# Patient Record
Sex: MALE | Race: BLACK OR AFRICAN AMERICAN | Employment: UNEMPLOYED | URBAN - METROPOLITAN AREA
[De-identification: names, ages, dates, MRNs, and addresses within clinical notes are randomized per-mention and may not be internally consistent; named-entity substitution may affect disease eponyms.]

---

## 2021-05-26 ENCOUNTER — HOSPITAL ENCOUNTER (EMERGENCY)
Age: 21
Discharge: HOME | End: 2021-05-26
Attending: EMERGENCY MEDICINE
Payer: MEDICAID

## 2021-05-26 VITALS
HEIGHT: 74 IN | WEIGHT: 185 LBS | TEMPERATURE: 97.3 F | OXYGEN SATURATION: 99 % | BODY MASS INDEX: 23.74 KG/M2 | SYSTOLIC BLOOD PRESSURE: 132 MMHG | DIASTOLIC BLOOD PRESSURE: 73 MMHG | HEART RATE: 85 BPM | RESPIRATION RATE: 16 BRPM

## 2021-05-26 DIAGNOSIS — G47.33 OSA (OBSTRUCTIVE SLEEP APNEA): Primary | ICD-10-CM

## 2021-05-26 PROCEDURE — 99283 EMERGENCY DEPT VISIT LOW MDM: CPT

## 2021-05-26 RX ORDER — FAMOTIDINE 20 MG/1
20 TABLET, FILM COATED ORAL 2 TIMES DAILY
Qty: 20 TABLET | Refills: 0 | Status: SHIPPED | OUTPATIENT
Start: 2021-05-26

## 2021-05-26 NOTE — ED PROVIDER NOTES
EMERGENCY DEPARTMENT HISTORY AND PHYSICAL EXAM      Date: 5/26/2021  Patient Name: Shona Azar    History of Presenting Illness     Chief Complaint   Patient presents with    Sleep Apnea       History Provided By: Patient and Patient's Grandmother    HPI: Shona Azar, 22 y.o. male without past medical history presents to the ED with cc of concern for choking while sleeping and sleep disturbance. Patient's grandmother brings patient into the emergency department because he has been having worsening episodes of choking and irregular breathing while he sleeps. Patient does not know about this but states that he will wake up sometimes coughing and choking. He states that he still feels rested after sleep. Sometimes he does need to take a nap throughout his day. This has been ongoing for the past 6 months but has been progressively worsening. Patient has never seen a sleep specialist about this. He denies any fevers, chills, shortness of breath, chest pain, or recent illness. He uses melatonin to help sleep sometimes but otherwise does not take any sleeping pills. He does not have any symptoms while awake. There are no other complaints, changes, or physical findings at this time. PCP: Pancho, MD Morales    No current facility-administered medications on file prior to encounter. No current outpatient medications on file prior to encounter. Past History     Past Medical History:  History reviewed. No pertinent past medical history. Past Surgical History:  History reviewed. No pertinent surgical history. Family History:  History reviewed. No pertinent family history.     Social History:  Social History     Tobacco Use    Smoking status: Never Smoker    Smokeless tobacco: Never Used   Substance Use Topics    Alcohol use: Not Currently    Drug use: Yes     Types: Marijuana       Allergies:  No Known Allergies      Review of Systems   Review of Systems   Constitutional: Negative for chills and fever. HENT: Negative. Eyes: Negative for visual disturbance. Respiratory: Positive for choking. Negative for cough, shortness of breath and wheezing. Cardiovascular: Negative for chest pain. Gastrointestinal: Negative for abdominal pain and vomiting. Neurological: Negative for headaches. All other systems reviewed and are negative. Physical Exam   Physical Exam  Vitals and nursing note reviewed. Constitutional:       General: He is not in acute distress. Appearance: Normal appearance. He is not ill-appearing, toxic-appearing or diaphoretic. HENT:      Head: Normocephalic and atraumatic. Mouth/Throat:      Comments: Posterior oropharynx with significantly large tonsils which are almost touching. No sign of obstruction, tolerating secretions, airway patent. Cardiovascular:      Rate and Rhythm: Normal rate and regular rhythm. Heart sounds: Normal heart sounds. No murmur heard. Pulmonary:      Effort: Pulmonary effort is normal. No respiratory distress. Breath sounds: Normal breath sounds. No wheezing. Abdominal:      Palpations: Abdomen is soft. Tenderness: There is no abdominal tenderness. There is no guarding or rebound. Musculoskeletal:      Cervical back: Normal range of motion and neck supple. No tenderness. Skin:     General: Skin is warm and dry. Findings: No erythema or rash. Neurological:      General: No focal deficit present. Mental Status: He is alert and oriented to person, place, and time. Diagnostic Study Results     Labs -   No results found for this or any previous visit (from the past 12 hour(s)). Radiologic Studies -   No orders to display     CT Results  (Last 48 hours)    None        CXR Results  (Last 48 hours)    None          Medical Decision Making   I am the first provider for this patient.     I reviewed the vital signs, available nursing notes, past medical history, past surgical history, family history and social history. Vital Signs-Reviewed the patient's vital signs. Visit Vitals  /73 (BP 1 Location: Right upper arm, BP Patient Position: At rest)   Pulse 85   Temp 97.3 °F (36.3 °C)   Resp 16   Ht 6' 2\" (1.88 m)   Wt 83.9 kg (185 lb)   SpO2 99%   BMI 23.75 kg/m²       Records Reviewed: Nursing Notes    Provider Notes (Medical Decision Making):   43-year-old male brought to the emergency department by his grandmother after witnessed episodes of irregular breathing and choking episodes while he sleeps. Patient is mostly unaware of these episodes, he does not have any episodes while he is awake. He states that he feels well rested. He does note that he snores frequently. He is afebrile and vital signs are stable, he has no other complaints here in the ED. My exam reveals enlarged tonsils bilaterally which almost touch, this is likely source of obstructive sleep apnea. No sign of infection, tonsillitis, posterior oropharyngeal abscess. Patient is afebrile and vital signs are stable. Encouraged follow-up with PCP, sleep medicine, and ENT. All questions answered and patient and grandmother agree with plan as above. ED Course:   Initial assessment performed. The patients presenting problems have been discussed, and they are in agreement with the care plan formulated and outlined with them. I have encouraged them to ask questions as they arise throughout their visit. Discharge Note:  The patient has been re-evaluated and is ready for discharge. Reviewed available results with patient. Counseled patient on diagnosis and care plan. Patient has expressed understanding, and all questions have been answered. Patient agrees with plan and agrees to follow up as recommended, or to return to the ED if their symptoms worsen. Discharge instructions have been provided and explained to the patient, along with reasons to return to the ED. Disposition:  Discharge    DISCHARGE PLAN:  1.    Discharge Medication List as of 5/26/2021  9:08 AM        2. Follow-up Information     Follow up With Specialties Details Why Contact Info    Meghann Trivedi MD Otolaryngology Schedule an appointment as soon as possible for a visit  for evaluation of enlarged tonsils [address] [facility]  [address]  [address] 077 [facility]      Orlando Schmidt MD Internal Medicine, Pulmonary Disease Schedule an appointment as soon as possible for a visit  for evaluation of sleep apnea and possible need for a sleep study [address]  [address]  [address] [phone] [address] [facility] Internal Medicine Schedule an appointment as soon as possible for a visit  to establish with a PCP [place] [id number] [place] [phone]        3. Return to ED if worse     Diagnosis     Clinical Impression:   1. CESAR (obstructive sleep apnea)        Attestations:  I am the first and primary provider of record for this patient's ED encounter. I personally performed the services described above in this documentation. Eduar Davis MD    Please note that this dictation was completed with 1Ring, the [employer] voice recognition software. Quite often unanticipated grammatical, syntax, homophones, and other interpretive errors are inadvertently transcribed by the computer software. Please disregard these errors. Please excuse any errors that have escaped final proofreading. Thank you.

## 2021-05-26 NOTE — ED TRIAGE NOTES
Patient presents to the ED with c/o shortness of breath when laying down. Pt reports that he sleeps well and does not notice any issues but his family reports that he takes long pauses in between breathing. Pt denies any nasal congestion or cough.

## 2021-05-26 NOTE — ED NOTES
Patient given copy of dc instructions and 1 paper script(s) and 0 electronic scripts. Patient  verbalized understanding of instructions and script (s). Patient given a current medication reconciliation form and verbalized understanding of their medications. Patient  verbalized understanding of the importance of discussing medications with  his or her physician or clinic they will be following up with. Patient alert and oriented and in no acute distress. Patient offered wheelchair from treatment area to hospital entrance, patient declined wheelchair.

## 2021-05-26 NOTE — DISCHARGE INSTRUCTIONS
It was a pleasure taking care of you in our Emergency Department today. We know that when you come to [address] [place] [address], you are entrusting us with your health, comfort, and safety. Our physicians and nurses honor that trust, and truly appreciate the opportunity to care for you and your loved ones. We also value your feedback. If you receive a survey about your Emergency Department experience today, please fill it out. We care about our patients' feedback, and we listen to what you have to say. Thank you!       Local Primary Care Physicians  [facility] [phone]  MD Vanita Alvarado MD Arnie Lance, MD [facility] [phone]  Nell Ramirez, [facility]  MD Diane Tyler MD Jodeen Person, MD [address] [facility] [facility] [phone]  [place] [address] [facility] [phone]  Enid Means, MD Perley Kim, MD Michalene Gaul, MD Marinell Runner, MD   [facility] [phone]  [facility], MD Dennys Purdy, MD [facility], NP [address] [phone]  Lauree Clinton, MD Ricka Duty, MD Danton Nettle, MD Lauri Lighter, MD Dorene Golas, MD Daine Ropes, MD Marcy Habermann, MD   [facility]  Evie Gonzales MD [address] [facility] [phone]  MD Branden Mckinnon, JERILYN Alvarenga, MD Karina Meneses MD Sol Cromer, MD Jim Sakai, MD   6644 [facility] [phone]  MD Robi Aquino, P  Sulma French, NP  MD Burgess Tra Low MD Roosvelt Puller, MD Jeff Lagunas MD [facility] [phone]  MD Paty Penaloza MD Pauleen Oas, MD Thom Paterson, MD  Gus Bakes, MD   [phone]  MD Rafia Thakkar MD [place] [phone]  MD Penny Higgins MD Bard Bilberry, MD   [facility] [phone]  MD Aidan Prado MD Bernestine Forster, MD Marilou Radon, MD Aldo Buddle, MD Verona Blazer, NP  Nathan Barkley MD 1619  66   [phone]  MD Aditya Devries MD Carey Clement, MD   2102 [facility] [phone]  MD Shanta Belcher, FNP  Kristie Gross, PAVidhiC  Kristie Grsos, FNP  Herman Fleischer, PAVidhiC  Julee Aase, MD Joelene Latus, NP   Charlie Davison,              Miscellaneous:  Gertrudis Hong MD [facility] Departments     For adult and child immunizations, family planning, TB screening, STD testing and women's health services. [facility]: [place] [phone]      [facility] Rosalee D 25   [address]   [address]   170 [facility]: Radha Myrick 200 [facility] [address] [phone]      [address] [place] [address]          [address] [place] [address]     For primary care services, woman and child wellness, and some clinics providing specialty care. [facility] -- [facility]. [address] [place] [address] [phone]   [address] [place] [address] [facility] 200 [facility] 3614 [facility] [phone]   [address] [place] [address] [address] [place] [address] [phone]   [address]  [employer] [address] [place] [address] [address] [place] [address] [phone]   [address] [place] [address] [address] [place] [phone]   [facility] 81 [facility] [phone]   Iain List the [facility] [address] [place] [address] [phone]   [facility]: [address] [phone], ext Candace [address] [place] [address], [phone]     [place] [address] [address] [phone]   [facility] Out[facility] 5850 [facility]  [phone]   [employer]  [address], [phone] 3588 [employer] ([email or web address]) [phone]         Sexual Health/Woman Wellness Clinics    For STD/HIV testing and treatment, pregnancy testing and services, men's health, birth control services, LGBT services, and hepatitis/HPV vaccine services. [facility] [employer] 201 [address] [facility] 75 [facility] 1579 600 EMRCY Solomon [phone]   [facility] [address], 5th floor [phone]   Pregnancy [address] [facility] [address]  Mauricio Manolo [phone]         [place]ocr[place] 9967 [facility] [address] [place] [address]   [phone]   [place]   [phone]   Women, Infant and Children's Services: [address] [phone]       [address] [place] [address]   [phone]   [address]   [facility] Psychiatry     [phone]   [address] Crisis   1212 [facility] [phone]

## 2022-11-08 ENCOUNTER — HOSPITAL ENCOUNTER (EMERGENCY)
Age: 22
Discharge: HOME | End: 2022-11-08
Attending: EMERGENCY MEDICINE
Payer: MEDICAID

## 2022-11-08 ENCOUNTER — APPOINTMENT (OUTPATIENT)
Dept: GENERAL RADIOLOGY | Age: 22
End: 2022-11-08
Attending: EMERGENCY MEDICINE
Payer: MEDICAID

## 2022-11-08 VITALS
BODY MASS INDEX: 23.44 KG/M2 | DIASTOLIC BLOOD PRESSURE: 85 MMHG | SYSTOLIC BLOOD PRESSURE: 142 MMHG | WEIGHT: 188.49 LBS | HEIGHT: 75 IN | TEMPERATURE: 98 F | OXYGEN SATURATION: 100 % | RESPIRATION RATE: 18 BRPM | HEART RATE: 81 BPM

## 2022-11-08 DIAGNOSIS — S46.911A STRAIN OF RIGHT SHOULDER, INITIAL ENCOUNTER: Primary | ICD-10-CM

## 2022-11-08 PROCEDURE — 73030 X-RAY EXAM OF SHOULDER: CPT

## 2022-11-08 PROCEDURE — 99283 EMERGENCY DEPT VISIT LOW MDM: CPT

## 2022-11-08 PROCEDURE — 74011250637 HC RX REV CODE- 250/637: Performed by: EMERGENCY MEDICINE

## 2022-11-08 RX ORDER — NAPROXEN 500 MG/1
500 TABLET ORAL 2 TIMES DAILY WITH MEALS
Qty: 20 TABLET | Refills: 0 | Status: SHIPPED | OUTPATIENT
Start: 2022-11-08 | End: 2022-11-18

## 2022-11-08 RX ORDER — METHOCARBAMOL 500 MG/1
500 TABLET, FILM COATED ORAL 4 TIMES DAILY
Qty: 20 TABLET | Refills: 0 | Status: SHIPPED | OUTPATIENT
Start: 2022-11-08

## 2022-11-08 RX ORDER — NAPROXEN 250 MG/1
500 TABLET ORAL
Status: COMPLETED | OUTPATIENT
Start: 2022-11-08 | End: 2022-11-08

## 2022-11-08 RX ADMIN — NAPROXEN 500 MG: 250 TABLET ORAL at 03:50

## 2022-11-08 NOTE — DISCHARGE INSTRUCTIONS
[facility] Departments     For adult and child immunizations, family planning, TB screening, STD testing and women's health services. [facility]: [place] [phone]      [facility] 25   [address]   [address]   170 [facility]: Danya Lamb [address] [phone]      [address]          [address] [place] [address]     For primary care services, woman and child wellness, and some clinics providing specialty care. [facility] -- [address] [facility] 2525 [facility] [phone]   411 [facility] 200 [facility] 3614 [facility] [phone]   339 [facility] 2[facility] [address] [phone] [address]  [facility] [address] [place] [address] 5850  Community  [phone]   [address] [place] [phone]   [facility] 81 [facility] [phone]   Genie Ocampo [facility] [address] [place] [address] [phone]   [facility]: [facility] 700 [address], ext [address] [facility], [address] [phone]     [place] [address] [facility] [address] [phone]   [place] 5850 [facility]  [phone]   [employer]  [address] ([email or web address]) [phone]         Sexual Health/Woman Wellness Clinics    For STD/HIV testing and treatment, pregnancy testing and services, men's health, birth control services, LGBT services, and hepatitis/HPV vaccine services. [facility] [employer] [facility] for [place] [employer] 201 N. [facility] 75 [facility] 1579 600 MERCY Bond [phone]   [facility] [address], 5th floor [phone]   Pregnancy [address] [facility] [address] N.  Daniele [facility] [phone]         Specialty Service [address]    [phone]   [place]   [phone]   Women, Infant and Children's Services: [address] [phone]       600 [facility]   [phone]   [address]   [facility] Psychiatry     [phone]   [address] Crisis   [address] [phone]       Local Primary Care Physicians  [facility] [phone]  MD Allyson Torres MD Yates Justice, MD [facility] [phone]  Swati Santos, P  Noé Alfaro, MD Chelsi Ratliff MD [address] [facility] [facility] [phone]  [facility], MD Maria Isabel Faust MD [address] [facility] [phone]  MD Norberto Joshi MD Gwenevere Forts, MD Blanca Duff, MD   [facility] [phone]  [place]NT[place], MD Theresa Childs [facility], NP [address] [employer] [address] [phone]  Dalia Joseph, MD Eve Ferguson, MD Sedrick Frias, MD Wily Ocampo MD Tomasita Mas, MD Geronimo Gunner, MD   33 57 [facility]  Eldon Erwin MD [address] [facility] [phone]  Andrae Fonseca, MD Mary Lou Wiggins, MD Renate Caro MD Benjamin Kail, MD Mikeal Payer, MD Haskell Crock, MD   5261 [facility] [phone]  MD Lester Perla, P  Adele Dozier, NP  Carlyn Werner, MD Jami Grover MD Rita Huntington, MD [facility] [phone]  Crista Pinta, MD Agnes Ormond, MD Lind Bees, MD Fabian Lor, MD Ollie Pinna, MD   [address] [phone]  MD Nandini Carlton MD [place] [phone]  MD Veena Uriarte MD Fredie Bailey Mily Smith, [address] [facility] Physicians [phone]  Gayatri Oquendo, MD Ashanti Daily, MD Sara Ribeiro MD  [facility] Jarvis, MD Yolanda Spears, MD Rodrigo Sanchez, NP  Teresita Vieira MD 1619  66   [phone]  Tesfaye Mcgraw, MD Merry Leavitt, MD Christopher Ureña MD   2102 [facility] [phone]  Edgar Lyons, MD Myrtie Mcburney, FNP  John Escobar, PA-C  John Escobar, FNP  Garrett Cole, PA-MD Renetta Palacio, JERILYN Crenshaw, DO Miscellaneous:  Scarlet Argueta [phone]

## 2022-11-08 NOTE — ED NOTES
I have reviewed verbal and written discharge instructions with the patient. The patient verbalized understanding.  Pt alert and ambulatory upon DC

## 2022-11-10 NOTE — ED PROVIDER NOTES
EMERGENCY DEPARTMENT HISTORY AND PHYSICAL EXAM      Date: 11/8/2022  Patient Name: Garth Aguillon    History of Presenting Illness     Chief Complaint   Patient presents with    Shoulder Injury     Right shoulder pain x 2 weeks. 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 Full ROM of RUE. Neuro check WNL. No tenderness with palpation. History Provided By: Patient    HPI: Garth Aguillon, 25 y.o. male with no significant past medical history presents to the ED with chief complaint of moderate to severe right shoulder pain that has been present for the past 2 weeks. Pain does not radiate patient reports he works for [employer] as a . He does a lot of heavy lifting on the job. 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 Denies any known injury. Patient is right-handed. Patient reports Tylenol helps his symptoms. It seems to be worse when he is at work and better when he rests. Denies any numbness, tingling, weakness. Denies any pain in his neck. Denies any previous injury or trauma. He is right-handed. PCP: Morales Deleon MD    No current facility-administered medications on file prior to encounter. Current Outpatient Medications on File Prior to Encounter   Medication Sig Dispense Refill    famotidine (Acid Reducer) 20 mg tablet Take 1 Tablet by mouth two (2) times a day. 20 Tablet 0       Past History     Past Medical History:  No past medical history on file. Past Surgical History:  No past surgical history on file. Family History:  No family history on file. Social History:  Social History     Tobacco Use    Smoking status: Never    Smokeless tobacco: Never   Substance Use Topics    Alcohol use: Not Currently    Drug use: Yes     Types: Marijuana       Allergies:  No Known Allergies      Review of Systems   Review of Systems   Constitutional:  Negative for chills and fever.    HENT: Negative. Respiratory:  Negative for cough, chest tightness, shortness of breath and wheezing. Cardiovascular:  Negative for chest pain and palpitations. Gastrointestinal:  Negative for abdominal pain, nausea and vomiting. Genitourinary: Negative. Musculoskeletal:  Positive for arthralgias. Negative for back pain, gait problem, myalgias, neck pain and neck stiffness. Skin: Negative. Neurological:  Negative for dizziness, syncope, light-headedness and headaches. Psychiatric/Behavioral: Negative. All other systems reviewed and are negative. Physical Exam   General appearance - well nourished, well appearing, and in no distress  Eyes - pupils equal and reactive, extraocular eye movements intact  ENT - mucous membranes moist, pharynx normal without lesions  Neck - supple, no significant adenopathy; non-tender to palpation  Chest - clear to auscultation, no wheezes, rales or rhonchi; non-tender to palpation  Heart - normal rate and regular rhythm, S1 and S2 normal, no murmurs noted  Abdomen - soft, nontender, nondistended, no masses or organomegaly  Musculoskeletal -mild tenderness to palpation right anterior shoulder, increased pain with abduction, no deformity or swelling; normal ROM  Extremities - peripheral pulses normal, no pedal edema  Skin - normal coloration and turgor, no rashes  Neurological - alert, oriented x3, normal speech, no focal findings or movement disorder noted    Diagnostic Study Results     Labs -   No results found for this or any previous visit (from the past 12 hour(s)). Radiologic Studies -   XR SHOULDER RT AP/LAT MIN 2 V   Final Result   No acute abnormality. CT Results  (Last 48 hours)      None          CXR Results  (Last 48 hours)      None              Medical Decision Making   I am the first provider for this patient.     I reviewed the vital signs, available nursing notes, past medical history, past surgical history, family history and social history. Vital Signs-Reviewed the patient's vital signs. Records Reviewed: Nursing Notes and Old Medical Records    Provider Notes (Medical Decision Making):   Patient presents to the ED with right shoulder pain for the past 2 weeks. No particular injury. Differential diagnosis includes rotator cuff injury, strain, bone spur  Will obtain x-ray to evaluate for bony abnormality. Suspect overuse injury. ED Course:   Initial assessment performed. The patients presenting problems have been discussed, and they are in agreement with the care plan formulated and outlined with them. I have encouraged them to ask questions as they arise throughout their visit. Progress Notes:     X-ray is unremarkable. Will treat with naproxen and Robaxin. Provided work note for patient to be off tomorrow and rest shoulder. Instructed to follow-up with PCP and/or Workman's Comp physician at [facility]. .  Disposition:  Discharge home    PLAN:  1. Discharge Medication List as of 11/8/2022  5:47 AM        START taking these medications    Details   naproxen (Naprosyn) 500 mg tablet Take 1 Tablet by mouth two (2) times daily (with meals) for 10 days. , Normal, Disp-20 Tablet, R-0      methocarbamoL (ROBAXIN) 500 mg tablet Take 1 Tablet by mouth four (4) times daily. , Normal, Disp-20 Tablet, R-0           CONTINUE these medications which have NOT CHANGED    Details   famotidine (Acid Reducer) 20 mg tablet Take 1 Tablet by mouth two (2) times a day., Print, Disp-20 Tablet, R-0           2. Follow-up Information       Follow up With Specialties Details Why Contact Info    [facility] EMERGENCY DEPT Emergency Medicine  If symptoms worsen [address] [place] [address]  [phone]    The primary care doctor of your choice from the list provided              Return to ED if worse     Diagnosis     Clinical Impression:   1.  Strain of right shoulder, initial encounter

## 2022-11-17 ENCOUNTER — HOSPITAL ENCOUNTER (EMERGENCY)
Age: 22
Discharge: HOME | End: 2022-11-17
Attending: STUDENT IN AN ORGANIZED HEALTH CARE EDUCATION/TRAINING PROGRAM
Payer: MEDICAID

## 2022-11-17 VITALS
RESPIRATION RATE: 19 BRPM | TEMPERATURE: 102.9 F | HEIGHT: 75 IN | BODY MASS INDEX: 32.33 KG/M2 | SYSTOLIC BLOOD PRESSURE: 118 MMHG | DIASTOLIC BLOOD PRESSURE: 67 MMHG | WEIGHT: 260 LBS | HEART RATE: 96 BPM | OXYGEN SATURATION: 99 %

## 2022-11-17 DIAGNOSIS — J10.1 INFLUENZA A: Primary | ICD-10-CM

## 2022-11-17 LAB
DEPRECATED S PYO AG THROAT QL EIA: NEGATIVE
FLUAV RNA SPEC QL NAA+PROBE: DETECTED
FLUBV RNA SPEC QL NAA+PROBE: NOT DETECTED
SARS-COV-2, COV2: NOT DETECTED

## 2022-11-17 PROCEDURE — 99283 EMERGENCY DEPT VISIT LOW MDM: CPT

## 2022-11-17 PROCEDURE — 74011250637 HC RX REV CODE- 250/637: Performed by: PHYSICIAN ASSISTANT

## 2022-11-17 PROCEDURE — 87070 CULTURE OTHR SPECIMN AEROBIC: CPT

## 2022-11-17 PROCEDURE — 87880 STREP A ASSAY W/OPTIC: CPT

## 2022-11-17 PROCEDURE — 87636 SARSCOV2 & INF A&B AMP PRB: CPT

## 2022-11-17 RX ORDER — DEXTROMETHORPHAN HYDROBROMIDE, GUAIFENESIN 20; 400 MG/20ML; MG/20ML
20 SOLUTION ORAL
Qty: 118 ML | Refills: 0 | Status: SHIPPED | OUTPATIENT
Start: 2022-11-17

## 2022-11-17 RX ORDER — ACETAMINOPHEN 325 MG/1
650 TABLET ORAL
Qty: 20 TABLET | Refills: 0 | Status: SHIPPED | OUTPATIENT
Start: 2022-11-17

## 2022-11-17 RX ORDER — ACETAMINOPHEN 325 MG/1
650 TABLET ORAL
Status: COMPLETED | OUTPATIENT
Start: 2022-11-17 | End: 2022-11-17

## 2022-11-17 RX ORDER — IBUPROFEN 800 MG/1
800 TABLET ORAL
Qty: 20 TABLET | Refills: 0 | Status: SHIPPED | OUTPATIENT
Start: 2022-11-17 | End: 2022-11-24

## 2022-11-17 RX ORDER — OSELTAMIVIR PHOSPHATE 75 MG/1
75 CAPSULE ORAL 2 TIMES DAILY
Qty: 10 CAPSULE | Refills: 0 | Status: SHIPPED | OUTPATIENT
Start: 2022-11-17 | End: 2022-11-22

## 2022-11-17 RX ADMIN — ACETAMINOPHEN 650 MG: 325 TABLET, FILM COATED ORAL at 18:10

## 2022-11-17 NOTE — ED NOTES
Pt arrives to the ED AAOX4 with a c/c of sore throat, generalized body ache, runny nose, and chills onset Tuesday. Pt is noted in stable condition, now in ED room with side rail up, bed to lowest position, and call light within reach. Will continue to monitor and wait for ED provider evaluation. Emergency Department Nursing Plan of Care       The Nursing Plan of Care is developed from the Nursing assessment and Emergency Department Attending provider initial evaluation. The plan of care may be reviewed in the ED Provider note.     The Plan of Care was developed with the following considerations:   Patient / Family readiness to learn indicated by:verbalized understanding  Persons(s) to be included in education: patient  Barriers to Learning/Limitations:No    Signed     Luis Alfredo Montgomery    11/17/2022   6:32 PM

## 2022-11-17 NOTE — ED PROVIDER NOTES
EMERGENCY DEPARTMENT HISTORY AND PHYSICAL EXAM      Please note that this dictation was completed with [email or web address], the computer voice recognition software. Quite often unanticipated grammatical, syntax, homophones, and other interpretive errors are inadvertently transcribed by the computer software. Please disregard these errors. Please excuse any errors that have escaped final proofreading. Date: 11/17/2022  Patient Name: Floyd Damon    History of Presenting Illness     Chief Complaint   Patient presents with    Sore Throat     Per pt reports sore throat, body aches and runny nose that started on Tuesday, \"I work for [employer] and it's starting to get cold now, I might have the flu. \"        History Provided By: Patient    HPI: Floyd Damon, 25 y.o. male with no significant past medical or surgical history presents ambulatory to the ED with cc of 2 days or so of mild and constant sore throat, body aches, runny nose, slight cough and fever for which he has found no relief taking Emergen-C (vitamin C supplement). There are no known sick contacts. There has been no recent travel. There has been no vomiting or diarrhea. There has been no chest pain or shortness of breath. He tells me he works at [employer] as a  and believes exposure to the cold weather may be responsible for his symptoms. He is unvaccinated against influenza. He is vaccinated against COVID-19. There are no other complaints, changes, or physical findings at this time. PCP: Morales Deleon MD    Current Outpatient Medications   Medication Sig Dispense Refill    ibuprofen (MOTRIN) 800 mg tablet Take 1 Tablet by mouth every eight (8) hours as needed (fever) for up to 7 days. 20 Tablet 0    acetaminophen (TYLENOL) 325 mg tablet Take 2 Tablets by mouth every six (6) hours as needed for Fever.  20 Tablet 0    dextromethorphan-guaiFENesin (Robitussin Cough-Chest Tima DM) 5-100 mg/5 mL liqd Take 20 mL by mouth four (4) times daily as needed for Cough or Congestion. 118 mL 0    oseltamivir (Tamiflu) 75 mg capsule Take 1 Capsule by mouth two (2) times a day for 5 days. 10 Capsule 0    naproxen (Naprosyn) 500 mg tablet Take 1 Tablet by mouth two (2) times daily (with meals) for 10 days. 20 Tablet 0    methocarbamoL (ROBAXIN) 500 mg tablet Take 1 Tablet by mouth four (4) times daily. 20 Tablet 0    famotidine (Acid Reducer) 20 mg tablet Take 1 Tablet by mouth two (2) times a day. 20 Tablet 0     Past History     Past Medical History:  History reviewed. No pertinent past medical history. Past Surgical History:  History reviewed. No pertinent surgical history. Family History:  History reviewed. No pertinent family history. Social History:  Social History     Tobacco Use    Smoking status: Never    Smokeless tobacco: Never   Substance Use Topics    Alcohol use: Not Currently    Drug use: Yes     Types: Marijuana       Allergies:  No Known Allergies  Review of Systems   Review of Systems   Constitutional:  Positive for fever. HENT:  Positive for rhinorrhea and sore throat. Negative for trouble swallowing. Eyes:  Negative for pain. Respiratory:  Positive for cough. Negative for shortness of breath. Cardiovascular:  Negative for chest pain. Gastrointestinal:  Negative for abdominal pain. Genitourinary:  Negative for flank pain. Musculoskeletal:  Positive for myalgias. Negative for back pain. Skin:  Negative for rash. Neurological:  Negative for headaches. Physical Exam   Physical Exam  Vitals and nursing note reviewed. Constitutional:       General: He is not in acute distress. Appearance: He is well-developed. He is not toxic-appearing. HENT:      Head: Normocephalic and atraumatic. No right periorbital erythema or left periorbital erythema. Right Ear: Tympanic membrane and external ear normal. There is no impacted cerumen. Tympanic membrane is not erythematous.       Left Ear: Tympanic membrane and external ear normal. There is no impacted cerumen. Tympanic membrane is not erythematous. Nose: Nose normal.      Mouth/Throat:      Mouth: Mucous membranes are moist.      Tonsils: 1+ on the right. 1+ on the left. Comments:   Mild bilateral tonsillar edema without erythema or exudate. Uvula is midline  No PTA. Eyes:      General: No scleral icterus. Conjunctiva/sclera: Conjunctivae normal.      Pupils: Pupils are equal, round, and reactive to light. Neck:      Comments:   Supple without meningismus  Cardiovascular:      Rate and Rhythm: Normal rate. Pulmonary:      Effort: Pulmonary effort is normal. No respiratory distress. Abdominal:      Palpations: Abdomen is soft. Tenderness: There is no abdominal tenderness. Musculoskeletal:         General: Normal range of motion. Cervical back: Normal range of motion. Skin:     Findings: No rash. Neurological:      Mental Status: He is alert and oriented to person, place, and time. He is not disoriented. Cranial Nerves: No cranial nerve deficit. Sensory: No sensory deficit. Psychiatric:         Speech: Speech normal.     Diagnostic Study Results     Labs -     Recent Results (from the past 12 hour(s))   STREP AG SCREEN, GROUP A    Collection Time: 11/17/22  6:13 PM    Specimen: Swab   Result Value Ref Range    Group A Strep Ag ID Negative NEG     COVID-19 WITH INFLUENZA A/B    Collection Time: 11/17/22  6:13 PM   Result Value Ref Range    SARS-CoV-2 by PCR Not detected NOTD      Influenza A by PCR Detected      Influenza B by PCR Not detected         Radiologic Studies -   No orders to display     CT Results  (Last 48 hours)      None          CXR Results  (Last 48 hours)      None          Medical Decision Making   I am the first provider for this patient. I reviewed the vital signs, available nursing notes, past medical history, past surgical history, family history and social history.     Vital Signs-Reviewed the patient's vital signs. Patient Vitals for the past 12 hrs:   Temp Pulse Resp BP SpO2   11/17/22 1728 (!) 102.9 °F (39.4 °C) 96 19 118/67 99 %       Pulse Oximetry Analysis - 99% on RA    Records Reviewed: Nursing Notes and Old Medical Records    Provider Notes (Medical Decision Making):   DDx: Influenza, streptococcal pharyngitis, COVID-19, viral syndrome    In spite of his fever, he is well-appearing and in no distress. Breathing is unlabored and lungs are clear. Abdomen is soft. Neck is supple. He is positive for influenza A. COVID testing and strep testing are negative. Additional testing deferred. Will offer prescriptions for fever reducers and cough medicine. I do prescribe Tamiflu however make him aware that the later it is started the less well it works. A note for work is provided. Refer to primary care. Return precautions for worsening symptoms or any concerns. ED Course:   Initial assessment performed. The patients presenting problems have been discussed, and they are in agreement with the care plan formulated and outlined with them. I have encouraged them to ask questions as they arise throughout their visit. Disposition:  Discharge    PLAN:  1. Current Discharge Medication List        START taking these medications    Details   ibuprofen (MOTRIN) 800 mg tablet Take 1 Tablet by mouth every eight (8) hours as needed (fever) for up to 7 days. Qty: 20 Tablet, Refills: 0  Start date: 11/17/2022, End date: 11/24/2022      acetaminophen (TYLENOL) 325 mg tablet Take 2 Tablets by mouth every six (6) hours as needed for Fever. Qty: 20 Tablet, Refills: 0  Start date: 11/17/2022      dextromethorphan-guaiFENesin (Robitussin Cough-Chest Tima DM) 5-100 mg/5 mL liqd Take 20 mL by mouth four (4) times daily as needed for Cough or Congestion. Qty: 118 mL, Refills: 0  Start date: 11/17/2022      oseltamivir (Tamiflu) 75 mg capsule Take 1 Capsule by mouth two (2) times a day for 5 days.   Qty: 10 Capsule, Refills: 0  Start date: 11/17/2022, End date: 11/22/2022           2. Follow-up Information       Follow up With Specialties Details Why Contact Info    [facility]  Call  PRIMARY CARE: as needed [address]   [phone]          Return to ED if worse     Diagnosis     Clinical Impression:   1. Influenza A            Attestations: This note is prepared in part by EBENEZER HEATON, during her clinical rotation. The Physician Assistant student's documentation has been prepared under my direction and personally reviewed by me in its entirety. I confirm that the note above accurately reflects all work, treatment, procedures, and medical decision making performed by me.     ABBEY Dutta

## 2022-11-17 NOTE — LETTER
[facility] EMERGENCY DEPT  [address] [address] [phone]    Work/School Note    Date: 11/17/2022    To Whom It May concern:    Kole Hawley was seen and treated today in the emergency room by the following provider(s):  Attending Provider: Del Kang MD  Physician Assistant: ABBEY Lemus. Kole Hawley tested positive for influenza A today in this facility. He may return to work once fever and symptom free for 24 hours without medication.     Sincerely,          ABBEY Maldonado

## 2022-11-18 NOTE — ED NOTES
Discharge instructions provided. Pt was given copy of discharge instructions with 0 paper script(s) and 4 electronic script(s). Pt verbalized understanding of the medication instructions, and the importance of following up as recommended by EDP. Pt has no further questions at this time. Wheelchair offered from treatment area to hospital entrance, pt declined. Pt leaving ED ambulatory and in stable condition.

## 2022-11-19 LAB
BACTERIA SPEC CULT: NORMAL
SERVICE CMNT-IMP: NORMAL

## 2024-06-07 NOTE — Clinical Note
[address]  [facility] EMERGENCY DEPT  [address] [place] [address]  [phone]    Work/School Note    Date: 11/8/2022    To Whom It May concern:      Valentina Griffin was seen and treated today in the emergency room by the following provider(s):  Attending Provider: Paty Cantu MD.      Valentina Griffin is excused from work/school on 11/08/22. He is clear to return to work/school on 11/09/22.         Sincerely,          Edward Ross MD
[address]  [facility] EMERGENCY DEPT  [address] [address]  [phone]    Work/School Note    Date: 11/8/2022    To Whom It May concern:      Kelly Barnett was seen and treated today in the emergency room by the following provider(s):  Attending Provider: Radha Smith MD.      Kelly Barnett is excused from work/school on 11/08/22. He is clear to return to work/school on 11/09/22.         Sincerely,          Lucio Aj MD
DISCHARGE